# Patient Record
Sex: FEMALE | Race: WHITE
[De-identification: names, ages, dates, MRNs, and addresses within clinical notes are randomized per-mention and may not be internally consistent; named-entity substitution may affect disease eponyms.]

---

## 2019-09-06 ENCOUNTER — HOSPITAL ENCOUNTER (EMERGENCY)
Dept: HOSPITAL 26 - MED | Age: 44
LOS: 1 days | Discharge: HOME | End: 2019-09-07
Payer: COMMERCIAL

## 2019-09-06 VITALS — DIASTOLIC BLOOD PRESSURE: 74 MMHG | SYSTOLIC BLOOD PRESSURE: 120 MMHG

## 2019-09-06 VITALS — BODY MASS INDEX: 27.71 KG/M2 | HEIGHT: 58 IN | WEIGHT: 132 LBS

## 2019-09-06 DIAGNOSIS — L03.031: Primary | ICD-10-CM

## 2019-09-06 DIAGNOSIS — Z98.890: ICD-10-CM

## 2019-09-06 NOTE — NUR
COVERING PRIMARY RN FOR LUNCH. 

PT TO ED FOR C/O RT #3 TOE PAIN. DENIES INJURY/TRAUMA. +CMS. +ROM. MILD REDNESS 
NOTED TO #3 TOE. PT PLACED INTO BED FOR MD ERNANDEZ.

## 2019-09-07 VITALS — SYSTOLIC BLOOD PRESSURE: 120 MMHG | DIASTOLIC BLOOD PRESSURE: 74 MMHG

## 2019-09-07 NOTE — NUR
PT DISCHARGED WITH PAPERWORK. RX MOTRIN, KELFEX. EDUCATED PT REGARDING 
MEDICATIONS AND S/E. EDUCATED PT REGARDING D/C DIAGNOSIS. PT VERBALIZED 
UNDERSTANDING OF TEACHING. TOLD PT TO FOLLOW UP WITH PCP AND WHEN TO RETURN TO 
ED. PT VSS. ALL QUESTIONS ANSWERED.